# Patient Record
Sex: MALE | Employment: OTHER | URBAN - METROPOLITAN AREA
[De-identification: names, ages, dates, MRNs, and addresses within clinical notes are randomized per-mention and may not be internally consistent; named-entity substitution may affect disease eponyms.]

---

## 2022-02-08 ENCOUNTER — COMPREHENSIVE EXAM (OUTPATIENT)
Dept: URBAN - METROPOLITAN AREA CLINIC 36 | Facility: CLINIC | Age: 67
End: 2022-02-08

## 2022-09-01 NOTE — PATIENT DISCUSSION
Meprobamate was filled on 10/15/18 for a 15 day supply. Rx denied. Monitor cataract for progression.
